# Patient Record
Sex: FEMALE | Race: BLACK OR AFRICAN AMERICAN | NOT HISPANIC OR LATINO | Employment: OTHER | URBAN - METROPOLITAN AREA
[De-identification: names, ages, dates, MRNs, and addresses within clinical notes are randomized per-mention and may not be internally consistent; named-entity substitution may affect disease eponyms.]

---

## 2017-08-14 ENCOUNTER — TELEPHONE (OUTPATIENT)
Dept: GASTROENTEROLOGY | Facility: CLINIC | Age: 52
End: 2017-08-14

## 2017-08-14 DIAGNOSIS — Z12.11 SPECIAL SCREENING FOR MALIGNANT NEOPLASMS, COLON: Primary | ICD-10-CM

## 2017-08-14 RX ORDER — CAPTOPRIL 25 MG/1
25 TABLET ORAL 2 TIMES DAILY
Refills: 3 | Status: ON HOLD | COMMUNITY
Start: 2017-07-12 | End: 2018-07-23

## 2017-08-14 RX ORDER — LOVASTATIN 20 MG/1
TABLET ORAL
Refills: 1 | COMMUNITY
Start: 2017-06-10 | End: 2020-03-19

## 2017-08-14 RX ORDER — NITROGLYCERIN 0.4 MG/1
TABLET SUBLINGUAL
Refills: 1 | COMMUNITY
Start: 2017-07-12 | End: 2020-08-12

## 2017-08-14 RX ORDER — ALBUTEROL SULFATE 90 UG/1
AEROSOL, METERED RESPIRATORY (INHALATION)
Refills: 3 | COMMUNITY
Start: 2017-07-12 | End: 2017-11-08 | Stop reason: SDUPTHER

## 2017-08-14 RX ORDER — AMLODIPINE BESYLATE 10 MG/1
TABLET ORAL
Refills: 3 | Status: ON HOLD | COMMUNITY
Start: 2017-07-12 | End: 2018-07-23

## 2017-08-14 RX ORDER — FLUTICASONE FUROATE AND VILANTEROL TRIFENATATE 100; 25 UG/1; UG/1
1 POWDER RESPIRATORY (INHALATION) DAILY
Refills: 1 | COMMUNITY
Start: 2017-07-28 | End: 2018-06-21

## 2017-08-14 RX ORDER — HYDROCHLOROTHIAZIDE 25 MG/1
TABLET ORAL
Refills: 3 | Status: ON HOLD | COMMUNITY
Start: 2017-07-12 | End: 2018-07-23

## 2017-08-14 RX ORDER — LORATADINE 10 MG/1
10 TABLET ORAL DAILY
Refills: 3 | Status: ON HOLD | COMMUNITY
Start: 2017-07-12 | End: 2022-01-01 | Stop reason: CLARIF

## 2017-08-14 NOTE — TELEPHONE ENCOUNTER
Reason for Colonoscopy Referral:screening   Any GI Symptoms:no   Last Colonoscopy: none  History of Colon Polyps: no  Family History of colon cancer or colon polyps (under 50- history of first degree relative w/colon cancer, what family member-age of onset: no  Iron Deficiency/Anemia: no   Meds reviewed and updated:yes   Anti-inflammatory meds/NSAIDS: no  Allergies updated: yes  6 month surgical history: no   Blood Thinners: no  Lung disease: no  Heart disease: no  Valve replacement: no  Kidney disease:no   SCHEDULED: 9/15/17     [facility]     Patient scheduled at [facility] for colonoscopy On 9/15/17 prep packet was mailed out and explained, patient verbalized understanding. Patient aware that surgery will call them with arrival time prior to scope.

## 2017-09-15 ENCOUNTER — TELEPHONE (OUTPATIENT)
Dept: GASTROENTEROLOGY | Facility: CLINIC | Age: 52
End: 2017-09-15

## 2018-03-16 ENCOUNTER — TELEPHONE (OUTPATIENT)
Dept: GASTROENTEROLOGY | Facility: CLINIC | Age: 53
End: 2018-03-16

## 2018-03-16 NOTE — TELEPHONE ENCOUNTER
A message was left on patient voice mail to give the office a call back in regards to scheduling Colonoscopy.

## 2018-04-02 ENCOUNTER — TELEPHONE (OUTPATIENT)
Dept: SURGERY | Facility: CLINIC | Age: 53
End: 2018-04-02

## 2018-04-02 DIAGNOSIS — Z12.11 SCREENING FOR COLON CANCER: Primary | ICD-10-CM

## 2018-04-02 RX ORDER — AMOXICILLIN 500 MG/1
CAPSULE ORAL
Refills: 0 | Status: ON HOLD | COMMUNITY
Start: 2018-01-13 | End: 2018-07-23 | Stop reason: HOSPADM

## 2018-04-02 RX ORDER — IBUPROFEN 800 MG/1
TABLET ORAL
Refills: 0 | COMMUNITY
Start: 2018-01-13 | End: 2020-03-19

## 2018-04-02 RX ORDER — TIOTROPIUM BROMIDE INHALATION SPRAY 3.12 UG/1
SPRAY, METERED RESPIRATORY (INHALATION)
Refills: 5 | COMMUNITY
Start: 2018-03-19 | End: 2018-06-21 | Stop reason: SDUPTHER

## 2018-04-02 RX ORDER — HYDROCODONE BITARTRATE AND ACETAMINOPHEN 5; 325 MG/1; MG/1
1 TABLET ORAL
Refills: 0 | COMMUNITY
Start: 2018-01-13 | End: 2018-06-21

## 2018-04-02 RX ORDER — PREDNISONE 10 MG/1
TABLET ORAL
Refills: 0 | COMMUNITY
Start: 2018-02-19 | End: 2018-04-20 | Stop reason: SDUPTHER

## 2018-04-02 RX ORDER — ALBUTEROL SULFATE 0.83 MG/ML
SOLUTION RESPIRATORY (INHALATION)
Refills: 3 | COMMUNITY
Start: 2018-03-21 | End: 2018-06-22

## 2018-04-02 NOTE — TELEPHONE ENCOUNTER
Reason for Colonoscopy Referral: Screening  Any GI Symptoms: No  Last Colonoscopy: Never  History of Colon Polyps: No  Family History of colon cancer or colon polyps (under 50- history of first degree relative w/colon cancer, what family member-age of onset: No  Iron Deficiency/Anemia:NO **IF YES, NEEDS APPT**  Meds reviewed and updated: yes 4/2/2018  Anti-inflammatory meds/NSAIDS: No  Allergies updated: yes 4/2/2018  6 month surgical history: No  Blood Thinners:No **IF YES, NEEDS APPT**  Lung disease: No  Heart disease: NO  Valve replacement: NO  Kidney disease: No  SCHEDULED: @[facility]

## 2018-04-10 ENCOUNTER — TELEPHONE (OUTPATIENT)
Dept: ENDOSCOPY | Facility: HOSPITAL | Age: 53
End: 2018-04-10

## 2018-04-10 DIAGNOSIS — Z12.11 SPECIAL SCREENING FOR MALIGNANT NEOPLASMS, COLON: Primary | ICD-10-CM

## 2018-04-10 RX ORDER — POLYETHYLENE GLYCOL 3350, SODIUM SULFATE ANHYDROUS, SODIUM BICARBONATE, SODIUM CHLORIDE, POTASSIUM CHLORIDE 236; 22.74; 6.74; 5.86; 2.97 G/4L; G/4L; G/4L; G/4L; G/4L
4 POWDER, FOR SOLUTION ORAL ONCE
Qty: 4000 ML | Refills: 0 | Status: SHIPPED | OUTPATIENT
Start: 2018-04-10 | End: 2018-04-10

## 2018-04-16 ENCOUNTER — TELEPHONE (OUTPATIENT)
Dept: ENDOSCOPY | Facility: HOSPITAL | Age: 53
End: 2018-04-16

## 2018-04-16 NOTE — TELEPHONE ENCOUNTER
Spoke with patient. Colonoscopy rescheduled to 5/1/18. New instructions mailed to patient. Patient verbalized understanding and has no questions at this time.

## 2018-04-16 NOTE — TELEPHONE ENCOUNTER
Dian,    I received a message from Alee/[facility] that the patient needs to reschedule Colonoscopy with Dr. Vázquez. She states the patient did not get instructions until today from daughter for appointment tomorrow.    Thank you,    Marichuy

## 2018-05-01 ENCOUNTER — TELEPHONE (OUTPATIENT)
Dept: ENDOSCOPY | Facility: HOSPITAL | Age: 53
End: 2018-05-01

## 2018-05-01 DIAGNOSIS — Z12.11 SPECIAL SCREENING FOR MALIGNANT NEOPLASMS, COLON: Primary | ICD-10-CM

## 2018-06-22 PROBLEM — D72.10 EOSINOPHILIA: Status: ACTIVE | Noted: 2018-06-22

## 2018-06-22 PROBLEM — F17.200 TOBACCO DEPENDENCE: Status: ACTIVE | Noted: 2018-06-22

## 2018-06-22 PROBLEM — J45.50 SEVERE PERSISTENT ASTHMA WITHOUT COMPLICATION: Status: ACTIVE | Noted: 2018-06-22

## 2018-07-14 PROBLEM — J45.901 EXACERBATION OF ASTHMA: Status: ACTIVE | Noted: 2018-07-14

## 2018-07-15 PROBLEM — R73.9 HYPERGLYCEMIA: Status: ACTIVE | Noted: 2018-07-15

## 2018-07-15 PROBLEM — J45.52 SEVERE PERSISTENT ASTHMA WITH STATUS ASTHMATICUS: Status: ACTIVE | Noted: 2018-06-22

## 2018-07-15 PROBLEM — J96.02 ACUTE RESPIRATORY FAILURE WITH HYPOXIA AND HYPERCARBIA: Status: ACTIVE | Noted: 2018-07-15

## 2018-07-15 PROBLEM — J96.01 ACUTE RESPIRATORY FAILURE WITH HYPOXIA AND HYPERCARBIA: Status: ACTIVE | Noted: 2018-07-15

## 2018-07-16 PROBLEM — D72.829 LEUKOCYTOSIS: Status: ACTIVE | Noted: 2018-07-16

## 2018-07-19 PROBLEM — R73.9 HYPERGLYCEMIA: Status: RESOLVED | Noted: 2018-07-15 | Resolved: 2018-07-19

## 2018-07-19 PROBLEM — D72.829 LEUKOCYTOSIS: Status: RESOLVED | Noted: 2018-07-16 | Resolved: 2018-07-19

## 2018-07-22 PROBLEM — J45.901 EXACERBATION OF ASTHMA: Status: ACTIVE | Noted: 2018-07-22

## 2018-08-23 ENCOUNTER — TELEPHONE (OUTPATIENT)
Dept: OBSTETRICS AND GYNECOLOGY | Facility: CLINIC | Age: 53
End: 2018-08-23

## 2018-08-23 NOTE — TELEPHONE ENCOUNTER
Contacted pt to inform Dr. Streeter is still in surgery until about 2:30, pt says her ride can't so she will reschedule. Pt rescheduled for 8/30 at 2:45pm, Patient verbalized understanding.

## 2018-11-07 PROBLEM — J96.01 ACUTE RESPIRATORY FAILURE WITH HYPOXIA AND HYPERCARBIA: Status: RESOLVED | Noted: 2018-07-15 | Resolved: 2018-11-07

## 2018-11-07 PROBLEM — J45.901 EXACERBATION OF ASTHMA: Status: RESOLVED | Noted: 2018-07-22 | Resolved: 2018-11-07

## 2018-11-07 PROBLEM — J45.50 SEVERE PERSISTENT ASTHMA WITHOUT COMPLICATION: Chronic | Status: ACTIVE | Noted: 2018-06-22

## 2018-11-07 PROBLEM — J96.02 ACUTE RESPIRATORY FAILURE WITH HYPOXIA AND HYPERCARBIA: Status: RESOLVED | Noted: 2018-07-15 | Resolved: 2018-11-07

## 2018-12-28 ENCOUNTER — HOSPITAL ENCOUNTER (OUTPATIENT)
Dept: RADIOLOGY | Facility: HOSPITAL | Age: 53
Discharge: HOME | End: 2018-12-28
Attending: INTERNAL MEDICINE
Payer: MEDICAID

## 2018-12-28 DIAGNOSIS — R05.3 COUGH, PERSISTENT: ICD-10-CM

## 2018-12-28 PROCEDURE — 71250 CT THORAX DX C-: CPT | Mod: TC,PO

## 2019-02-27 ENCOUNTER — TELEPHONE (OUTPATIENT)
Dept: PHARMACY | Facility: CLINIC | Age: 54
End: 2019-02-27

## 2019-03-02 PROBLEM — J45.52 SEVERE PERSISTENT ASTHMA WITH STATUS ASTHMATICUS: Status: ACTIVE | Noted: 2019-03-02

## 2019-03-02 PROBLEM — R06.03 RESPIRATORY DISTRESS: Status: ACTIVE | Noted: 2019-03-02

## 2019-03-02 PROBLEM — J44.89 STATUS ASTHMATICUS WITH COPD (CHRONIC OBSTRUCTIVE PULMONARY DISEASE): Status: ACTIVE | Noted: 2019-03-02

## 2019-03-02 PROBLEM — R73.03 PREDIABETES: Status: ACTIVE | Noted: 2018-10-15

## 2019-03-02 PROBLEM — J45.902 STATUS ASTHMATICUS WITH COPD (CHRONIC OBSTRUCTIVE PULMONARY DISEASE): Status: ACTIVE | Noted: 2019-03-02

## 2019-03-02 PROBLEM — J11.1 INFLUENZA: Status: ACTIVE | Noted: 2019-03-02

## 2019-03-05 PROBLEM — D72.829 LEUKOCYTOSIS: Status: RESOLVED | Noted: 2018-07-16 | Resolved: 2019-03-05

## 2019-03-06 PROBLEM — R06.03 RESPIRATORY DISTRESS: Status: RESOLVED | Noted: 2019-03-02 | Resolved: 2019-03-06

## 2019-03-06 NOTE — TELEPHONE ENCOUNTER
DOCUMENTATION ONLY  The prior authorization request for Fasenra was denied by the patient's insurance.   Forwarded to the clinical pharmacist for review. 03/11 9:12am BRITTANY          DOCUMENTATION ONLY  Submitted prior authorization request for Fasenra to insurance on 03/06 4:01pm. BRITTANY

## 2019-03-27 ENCOUNTER — TELEPHONE (OUTPATIENT)
Dept: PHARMACY | Facility: CLINIC | Age: 54
End: 2019-03-27

## 2019-03-27 NOTE — TELEPHONE ENCOUNTER
Call attempt 1 to inquire whether Ms. Littlejohn has recently filled her montelukast script. PA for Fasenra has been denied due to lack adherence with montelukast and missing FEV1 data. FEV1 data has been obtained. Once Ms. Littlejohn verifies she is currently taking the montelukast, the Fasenra can be appealed on her behalf. No VM option on Mobile; LVM on home number. MyChart not active.

## 2019-04-03 NOTE — TELEPHONE ENCOUNTER
Call attempt 2 to inquire whether Ms. Littlejohn has recently filled her montelukast script - LVM. PA for Fasenra has been denied due to lack adherence with montelukast and missing FEV1 data. FEV1 data has been obtained. Once Ms. Littlejohn verifies she is currently taking the montelukast, [facility] can resubmit with the required information.    Bon Cabello, PharmD  Clinical Pharmacist   [facility]   P: [phone]

## 2019-04-16 ENCOUNTER — TELEPHONE (OUTPATIENT)
Dept: PHARMACY | Facility: CLINIC | Age: 54
End: 2019-04-16

## 2019-04-16 NOTE — TELEPHONE ENCOUNTER
Spoke to patient concerning appeal for Fasenra. Insurance wants to make sure she is compliant on her Singulair. Patient states she is and fills at [facility]. Will submit the appeal with the given information.

## 2019-04-17 PROBLEM — J45.51 SEVERE PERSISTENT ASTHMA WITH ACUTE EXACERBATION: Chronic | Status: ACTIVE | Noted: 2019-03-02

## 2019-05-08 ENCOUNTER — TELEPHONE (OUTPATIENT)
Dept: PHARMACY | Facility: CLINIC | Age: 54
End: 2019-05-08

## 2019-05-08 NOTE — TELEPHONE ENCOUNTER
DOCUMENTATION ONLY  Submitted prior authorization request for Fasenra to insurance on 05/08 11:04am. BRITTANY

## 2019-05-15 NOTE — TELEPHONE ENCOUNTER
DOCUMENTATION ONLY  The prior authorization request for Fasenra was denied by the patient's insurance.   Forwarded to the clinical pharmacist for review. 05/15 12:56pm BRITTANY

## 2019-06-14 ENCOUNTER — TELEPHONE (OUTPATIENT)
Dept: PHARMACY | Facility: CLINIC | Age: 54
End: 2019-06-14

## 2019-06-14 NOTE — TELEPHONE ENCOUNTER
Call Attempt #1 for initial consultation for Jeannie.   Voice Mailbox Full. MyChart not available.

## 2019-06-14 NOTE — TELEPHONE ENCOUNTER
DOCUMENTATION ONLY:  Prior Authorization APPEAL for Jeannie approved from 06/14/19 to 06/14/2020    Co-pay: $0    Patient Assistance IS NOT required.    Forwarded to the clinical pharmacist for consult and shipment.    -ARR

## 2019-06-17 ENCOUNTER — TELEPHONE (OUTPATIENT)
Dept: PHARMACY | Facility: CLINIC | Age: 54
End: 2019-06-17

## 2019-06-17 NOTE — TELEPHONE ENCOUNTER
Initial Fasenra consult completed on . Fasenra will sent via  to provider's office on . $0.00 copay. Fasenra first dose date planned upon scheduled appointment. Address confirmed. Confirmed 2 patient identifiers - name and . Therapy Appropriate.    Will be delivered to  ATTN: Lida Kirkland  [facility]  [address] [facility], [facility]   [place] [address]    Indication: asthma  Goals of treatment: Reduction in frequency of asthma flares    Patient was counseled on the administration directions:  · To be injected by providers office - Inject 30mg subcutaneously every 4 weeks for the first 3 doses and every 8 weeks thereafter.   · Inject into upper arm, thigh or abdomen  · Do not discontinue asthma medications unless discussed with provider    Patient was counseled on the following possible side effects, which include, but are not limited to:  · Headache, pharyngitis, and fever  · Hypersensitivity- rash and itching      DDIs: medication list reviewed, no DDI's with Fasenra upon initial review.       Consultation included: the importance of compliance and the importance of keeping all follow up appointments.  Patient understands to report any medication changes to [facility] and provider. All questions answered and addressed to patients satisfaction. [facility] will touch base with patient 1 week after start and [facility] to contact patient in 3 weeks for refills. Patient verbalized understanding.      Devaughn Reynaga, PharmD  Clinical Pharmacist  [facility]  P: [phone]    [facility] sent  @ 4:32PM

## 2019-08-05 ENCOUNTER — TELEPHONE (OUTPATIENT)
Dept: PHARMACY | Facility: CLINIC | Age: 54
End: 2019-08-05

## 2019-08-05 NOTE — TELEPHONE ENCOUNTER
RX call regarding Fasenra from [facility] (2nd dose-30 days). Patient reached-- gave permission to send medication to [facility]. Copay of 0.00 @ 004. Address and  confirmed. Patient has 0 doses on hand at this time. Patient has not started any new medications, has had no missed doses and no side effects present. Patient is currently taking the medication as directed by doctors instruction. Patient states they do not have any questions or concerns at this time.     **Patient does not have an appointment set up yet, Skyped Lida Kirkland (CURTIS) in regards to sending the medication to her facility.. **    Lida Kirkland (LPN) replied giving permission to  on Wednesday,  to the address of:  ATTN: Lida Kirkland  [facility]  [address] [place] [address], [facility]   [place] [address]

## 2019-08-30 ENCOUNTER — TELEPHONE (OUTPATIENT)
Dept: PHARMACY | Facility: CLINIC | Age: 54
End: 2019-08-30

## 2019-08-30 NOTE — TELEPHONE ENCOUNTER
RX call regarding Fasenra from [facility]. Patient reached-- gave permission to send medication to [facility]. Name and  confirmed. Patient has 0 doses on hand at this time. Patient has not started any new medications, has had no missed doses and no side effects present. Patient is currently taking the medication as directed by doctors instruction. Patient states they do not have any questions or concerns at this time.     Patient does not have an appointment set up yet but it is due for Thursday, .    Emailed Cedrick Garcia for approval to  medication to his facility on 9/3. Awaiting a response..

## 2019-12-02 ENCOUNTER — TELEPHONE (OUTPATIENT)
Dept: PHARMACY | Facility: CLINIC | Age: 54
End: 2019-12-02

## 2019-12-02 NOTE — TELEPHONE ENCOUNTER
Call Attempt #1. Does not look like patient received Fasenra injection. Patient was a no-show for her 11/22 appointment but went to ER for asthma exacerbation. Not able to leave a message on either line.  No MyChart available.

## 2019-12-10 NOTE — TELEPHONE ENCOUNTER
Call attempt 2 to determine if patient received/is still taking Fasenra. Unable to leave message on mobile number (voicemail not setup) and home number out of service. MyChart pending.     Alton Meyer, PharmD  Clinical Pharmacist  [facility]  P: [phone]

## 2019-12-17 ENCOUNTER — TELEPHONE (OUTPATIENT)
Dept: PHARMACY | Facility: CLINIC | Age: 54
End: 2019-12-17

## 2019-12-17 NOTE — TELEPHONE ENCOUNTER
Patient states she is unsure if she is still supposed to be getting the Fasenra. She would like me to reach out to the [facility] to inquire. Yumiko garcia.

## 2019-12-19 NOTE — TELEPHONE ENCOUNTER
Refill and followup call for Fasenra. Patient confirmed need of the refill. Will deliver to   Attn: Cedrick Garcia  [facility]  [address] [place] [address].  [facility] [address]  On  with patient consent. Copay $0.00 at 004. Address confirmed. Patient has 0 doses remaining (0 day supply)/ next injection due upon appointment. Patient denies missed doses and no side effects.  No new medications/allergies/medical conditions. Labs are stable. No ER/Urgent care visits in past month. Patient taking the medication as directed. Patient denies any further questions. Confirmed 2 patient identifiers - Name and .     Devaughn Reynaga, PharmD  Clinical Pharmacist  [facility]  P: [phone]

## 2020-01-31 ENCOUNTER — TELEPHONE (OUTPATIENT)
Dept: PHARMACY | Facility: CLINIC | Age: 55
End: 2020-01-31

## 2020-02-05 NOTE — TELEPHONE ENCOUNTER
RX call attempt 2 regarding Fasenra refill from [facility]. Patient reached and stated she did not make her previous set appointment on Monday, 12/23 due to it raining and her being unable to get a ride.. Therefore the facility should still have 1 injection on hand.. Patient asked if I could reschedule her appointment, I explained that I do not have that capability that she would have to do so through the [facility]. Patient understood. Will alert [facility] group of missed dose, opening an Intervention and a Clinical Adherence Activity and pending out accordingly.   (copay $3.90)

## 2020-03-19 ENCOUNTER — TELEPHONE (OUTPATIENT)
Dept: PHARMACY | Facility: CLINIC | Age: 55
End: 2020-03-19

## 2020-04-01 ENCOUNTER — TELEPHONE (OUTPATIENT)
Dept: PHARMACY | Facility: CLINIC | Age: 55
End: 2020-04-01

## 2020-05-07 ENCOUNTER — TELEPHONE (OUTPATIENT)
Dept: PHARMACY | Facility: CLINIC | Age: 55
End: 2020-05-07

## 2020-05-07 NOTE — TELEPHONE ENCOUNTER
DOCUMENTATION ONLY:  Prior authorization for Fasenra approved from 01/01/20 to 12/31/20    Case Id: [id number]    Co-pay: $3.90    Patient Assistance IS NOT required

## 2020-05-11 ENCOUNTER — TELEPHONE (OUTPATIENT)
Dept: PHARMACY | Facility: CLINIC | Age: 55
End: 2020-05-11

## 2020-05-11 NOTE — TELEPHONE ENCOUNTER
Initial Fasenra consult completed on . Fasenra will be shipped on  to arrive at patient's home on  via [employer]. $ 3.90 copay and permission to charge CC on file. Patient intends to continue Fasenra on . Address confirmed. Confirmed 2 patient identifiers - name and . Therapy Appropriate.    Patient has been taking Fasenra as the facility-administered prefilled syringe. Patient states that she was shown how to do her administration with the auto injector at the provider's office, so she declined much of the administration consult. Encouraged her to review the manufacture video for administration and to review the documentation included with the Fasenra packaging.      Indication: Asthma    Storage: keep refrigerated, do not freeze, do not shake. Keep in original carton to protect from light. Keep away from heat. Fasenra is stable for 14 days at room temperature.    Take out of the refrigerator 30 minutes prior to injection and allow to reach room temperature.    Counseled patient on administration directions:  - Inject 30 mg (1 autoinjector) every 8 weeks.   - Remove auto injector from carton and inspect pen window and expiration date.  -Medication should be clear/colorless to slightly yellow. Air bubbles and small white particles are normal. Do not inject if liquid is cloudy, discolored, or contains large particles.   - Wash hands before and after injection.  - Monthly RX will come with gauze, band aids, and alcohol swabs.  - Patient may inject in either the tops of the thighs, abdomen- but at least 2 inches away from belly button, or the upper arm (with assistance).   - Discard in sharps container; do not recap. Once full, per [place] law, she/he should lock the sharps container and place it in trash. Pharmacy will replace the sharps at no additional charge.    Patient was counseled on possible side effects:   - headache and sore throat   - Injection site reaction: redness, soreness, itching, bruising,  which should resolve within 3-5 days. Monitor for signs of allergic reaction.  -Signs of an allergic reaction, like rash; hives; itching; red, swollen, blistered, or peeling skin with or without fever; wheezing; tightness in the chest or throat; trouble breathing or talking; unusual hoarseness; or swelling of the mouth, face, lips, tongue, or throat.    Med rec completed and allergies reviewed. No known drug/allergy interactions with Fasenra.    Advised to keep a calendar to stay compliant.     Consultation included the importance of compliance and of keeping all follow up appointments.  Patient understands to report any medication changes to [facility] and provider. All questions answered and addressed to patient's satisfaction. [facility] to contact patient in 3 weeks for refills.    Devaughn Reynaga, PharmD  Clinical Pharmacist  [facility]  P: [phone]

## 2020-07-01 ENCOUNTER — TELEPHONE (OUTPATIENT)
Dept: PHARMACY | Facility: CLINIC | Age: 55
End: 2020-07-01

## 2020-07-06 ENCOUNTER — TELEPHONE (OUTPATIENT)
Dept: PHARMACY | Facility: CLINIC | Age: 55
End: 2020-07-06

## 2020-08-20 DIAGNOSIS — U07.1 COVID-19 VIRUS DETECTED: ICD-10-CM

## 2020-08-25 ENCOUNTER — TELEPHONE (OUTPATIENT)
Dept: PHARMACY | Facility: CLINIC | Age: 55
End: 2020-08-25

## 2020-11-29 ENCOUNTER — SPECIALTY PHARMACY (OUTPATIENT)
Dept: PHARMACY | Facility: CLINIC | Age: 55
End: 2020-11-29

## 2020-12-10 NOTE — TELEPHONE ENCOUNTER
Initial Fasenra consult declined on 12/10. Patient is restarting Fasenra and does not want to review anything. Fasenra will be sent to patient's house. MD states patient can restart treatment with at home dosing.  $0 copay.  Fasenra first dose date planned for . Address confirmed. Confirmed 2 patient identifiers - name and . Therapy Appropriate   Specialty Pharmacy - Initial Clinical Assessment  Specialty Pharmacy - Medication/Referral Authorization    Specialty Medication Orders Linked to Encounter      Most Recent Value   Medication #1  benralizumab (FASENRA PEN) 30 mg/mL AtIn (Order#[id number], Rx#)   Medication #2  benralizumab (FASENRA PEN) 30 mg/mL AtIn (Order#[id number], Rx#)          Refill Questions - Documented Responses      Most Recent Value   Relationship to patient of person spoken to?  Self   HIPAA/medical authority confirmed?  Yes   Can the patient store medication/sharps container properly (at the correct temperature, away from children/pets, etc.)?  Yes   Can the patient call emergency services (911) in the event of an emergency?  Yes   Does the patient have any concerns or questions about taking or administering this medication as prescribed?  No   How many doses does the patient have on hand?  0   During the past 4 weeks, has patient missed any activities due to condition or medication?  No   During the past 4 weeks, did patient have any of the following urgent care visits?  None   How will the patient receive the medication?  Mail   When does the patient need to receive the medication?  20   Shipping Address  Home   Address in [facility] confirmed and updated if neccessary?  Yes   Expected Copay ($)  0   Is the patient able to afford the medication copay?  Yes   Payment Method  zero copay   Days supply of Refill  28   Refill activity completed?  Yes   Refill activity plan  Refill scheduled   Shipment/Pickup Date:  20          Current Outpatient Medications   Medication  Sig    albuterol (ACCUNEB) 1.25 mg/3 mL Nebu Take 3 mLs (1.25 mg total) by nebulization every 6 (six) hours as needed. Rescue    albuterol (PROAIR HFA) 90 mcg/actuation inhaler Inhale 2 puffs into the lungs every 6 (six) hours as needed for Wheezing. Rescue    albuterol-ipratropium (DUO-NEB) 2.5 mg-0.5 mg/3 mL nebulizer solution 3 mLs.    amLODIPine (NORVASC) 10 MG tablet Take 1 tablet (10 mg total) by mouth once daily.    atorvastatin (LIPITOR) 40 MG tablet Take 40 mg by mouth once daily.    benralizumab (FASENRA PEN) 30 mg/mL AtIn Inject 30 mg into the skin every 28 days.    benralizumab (FASENRA PEN) 30 mg/mL AtIn Inject 30 mg into the skin every 8 weeks.    blood-glucose meter Misc 1 Units.    captopril (CAPOTEN) 25 MG tablet Take 1 tablet (25 mg total) by mouth 2 (two) times daily.    ciprofloxacin HCl (CILOXAN) 0.3 % ophthalmic solution INSTILL 1 DROP INTO RIGHT EYE 4 TIMES A DAY    fluticasone propionate (FLOVENT HFA) 110 mcg/actuation inhaler Inhale 1 puff into the lungs every 4 to 6 hours as needed (1 puff in combination with albuterol as needed for rescue).    fluticasone-salmeterol diskus inhaler 500-50 mcg Inhale 1 puff into the lungs 2 (two) times daily. Controller    hydroCHLOROthiazide (HYDRODIURIL) 25 MG tablet Take 1 tablet (25 mg total) by mouth once daily.    ketorolac 0.5% (ACULAR) 0.5 % Drop INSTILL 1 DROP INTO RIGHT EYE 4 TIMES A DAY    lisinopriL (PRINIVIL,ZESTRIL) 40 MG tablet Take 40 mg by mouth once daily.    loratadine (CLARITIN) 10 mg tablet Take 10 mg by mouth once daily.    metFORMIN (GLUCOPHAGE-XR) 500 MG ER 24hr tablet Take 500 mg by mouth 2 (two) times daily.    metoprolol succinate (TOPROL-XL) 25 MG 24 hr tablet Take 25 mg by mouth once daily.    montelukast (SINGULAIR) 10 mg tablet montelukast 10 mg tablet   TAKE 1 TABLET BY MOUTH EVERY DAY    prednisoLONE acetate (PRED FORTE) 1 % DrpS INSTILL 1 DROP INTO RIGHT EYE 4 TIMES A DAY    predniSONE (DELTASONE) 10 MG  tablet Take 1 tablet (10 mg total) by mouth once daily.    SPIRIVA WITH HANDIHALER 18 mcg inhalation capsule Inhale 1 capsule into the lungs once daily.   Last reviewed on 11/12/2020  4:23 PM by Iliana Hinson NP    Review of patient's allergies indicates:   Allergen Reactions    Aleve [naproxen sodium] Hives, Shortness Of Breath and Itching    Pneumococcal 23-carrington ps vaccine Itching    Last reviewed on  11/12/2020 4:23 PM by Iliana Hinson      Tasks added this encounter   1/6/2021 - Refill Call (Auto Added)  3/9/2021 - Clinical - Follow Up Assesement (90 day)   Tasks due within next 3 months   No tasks due.     Lida Dawson, PharmD  [facility]  [address] [place] [id number]  Phone: [phone]  Fax: [phone]

## 2021-01-01 ENCOUNTER — SPECIALTY PHARMACY (OUTPATIENT)
Dept: PHARMACY | Facility: CLINIC | Age: 56
End: 2021-01-01
Payer: MEDICARE

## 2021-01-01 ENCOUNTER — SPECIALTY PHARMACY (OUTPATIENT)
Dept: PHARMACY | Facility: CLINIC | Age: 56
End: 2021-01-01

## 2021-01-01 ENCOUNTER — TELEPHONE (OUTPATIENT)
Dept: SMOKING CESSATION | Facility: CLINIC | Age: 56
End: 2021-01-01
Payer: MEDICARE

## 2021-01-12 ENCOUNTER — SPECIALTY PHARMACY (OUTPATIENT)
Dept: PHARMACY | Facility: CLINIC | Age: 56
End: 2021-01-12

## 2021-02-02 ENCOUNTER — SPECIALTY PHARMACY (OUTPATIENT)
Dept: PHARMACY | Facility: CLINIC | Age: 56
End: 2021-02-02

## 2021-02-05 ENCOUNTER — OFFICE VISIT (OUTPATIENT)
Dept: URGENT CARE | Facility: CLINIC | Age: 56
End: 2021-02-05
Payer: MEDICARE

## 2021-02-05 VITALS
HEIGHT: 62 IN | WEIGHT: 150 LBS | HEART RATE: 79 BPM | OXYGEN SATURATION: 95 % | SYSTOLIC BLOOD PRESSURE: 183 MMHG | BODY MASS INDEX: 27.6 KG/M2 | TEMPERATURE: 98 F | DIASTOLIC BLOOD PRESSURE: 93 MMHG | RESPIRATION RATE: 24 BRPM

## 2021-02-05 DIAGNOSIS — Z72.0 TOBACCO USE: ICD-10-CM

## 2021-02-05 DIAGNOSIS — J44.1 COPD EXACERBATION: Primary | ICD-10-CM

## 2021-02-05 PROCEDURE — 1126F PR PAIN SEVERITY QUANTIFIED, NO PAIN PRESENT: ICD-10-PCS | Mod: S$GLB,,, | Performed by: PHYSICIAN ASSISTANT

## 2021-02-05 PROCEDURE — 94640 PR INHAL RX, AIRWAY OBST/DX SPUTUM INDUCT: ICD-10-PCS | Mod: S$GLB,,, | Performed by: FAMILY MEDICINE

## 2021-02-05 PROCEDURE — 96372 PR INJECTION,THERAP/PROPH/DIAG2ST, IM OR SUBCUT: ICD-10-PCS | Mod: 59,S$GLB,, | Performed by: FAMILY MEDICINE

## 2021-02-05 PROCEDURE — 1126F AMNT PAIN NOTED NONE PRSNT: CPT | Mod: S$GLB,,, | Performed by: PHYSICIAN ASSISTANT

## 2021-02-05 PROCEDURE — 99205 PR OFFICE/OUTPT VISIT, NEW, LEVL V, 60-74 MIN: ICD-10-PCS | Mod: 25,S$GLB,, | Performed by: PHYSICIAN ASSISTANT

## 2021-02-05 PROCEDURE — 3008F BODY MASS INDEX DOCD: CPT | Mod: CPTII,S$GLB,, | Performed by: PHYSICIAN ASSISTANT

## 2021-02-05 PROCEDURE — 94640 AIRWAY INHALATION TREATMENT: CPT | Mod: S$GLB,,, | Performed by: FAMILY MEDICINE

## 2021-02-05 PROCEDURE — 96372 THER/PROPH/DIAG INJ SC/IM: CPT | Mod: 59,S$GLB,, | Performed by: FAMILY MEDICINE

## 2021-02-05 PROCEDURE — 3008F PR BODY MASS INDEX (BMI) DOCUMENTED: ICD-10-PCS | Mod: CPTII,S$GLB,, | Performed by: PHYSICIAN ASSISTANT

## 2021-02-05 PROCEDURE — 99205 OFFICE O/P NEW HI 60 MIN: CPT | Mod: 25,S$GLB,, | Performed by: PHYSICIAN ASSISTANT

## 2021-02-05 RX ORDER — PREDNISONE 20 MG/1
40 TABLET ORAL DAILY
Qty: 6 TABLET | Refills: 1 | Status: SHIPPED | OUTPATIENT
Start: 2021-02-05 | End: 2021-02-10

## 2021-02-05 RX ORDER — PREDNISONE 20 MG/1
20 TABLET ORAL
Status: COMPLETED | OUTPATIENT
Start: 2021-02-05 | End: 2021-02-05

## 2021-02-05 RX ORDER — FLUTICASONE PROPIONATE AND SALMETEROL 500; 50 UG/1; UG/1
POWDER RESPIRATORY (INHALATION)
COMMUNITY
End: 2021-02-10 | Stop reason: SDUPTHER

## 2021-02-05 RX ORDER — ALBUTEROL SULFATE 0.63 MG/3ML
1.26 SOLUTION RESPIRATORY (INHALATION)
Status: DISCONTINUED | OUTPATIENT
Start: 2021-02-05 | End: 2021-02-05

## 2021-02-05 RX ORDER — ALBUTEROL SULFATE 0.83 MG/ML
5 SOLUTION RESPIRATORY (INHALATION)
Status: COMPLETED | OUTPATIENT
Start: 2021-02-05 | End: 2021-02-05

## 2021-02-05 RX ORDER — IPRATROPIUM BROMIDE 0.5 MG/2.5ML
1 SOLUTION RESPIRATORY (INHALATION)
Status: COMPLETED | OUTPATIENT
Start: 2021-02-05 | End: 2021-02-05

## 2021-02-05 RX ORDER — FLUTICASONE PROPIONATE 110 UG/1
1 AEROSOL, METERED RESPIRATORY (INHALATION) 2 TIMES DAILY
Qty: 12 G | Refills: 0 | Status: SHIPPED | OUTPATIENT
Start: 2021-02-05 | End: 2021-03-01

## 2021-02-05 RX ORDER — AZITHROMYCIN 250 MG/1
TABLET, FILM COATED ORAL
Qty: 6 TABLET | Refills: 0 | Status: SHIPPED | OUTPATIENT
Start: 2021-02-05 | End: 2021-02-10

## 2021-02-05 RX ADMIN — ALBUTEROL SULFATE 5 MG: 0.83 SOLUTION RESPIRATORY (INHALATION) at 10:02

## 2021-02-05 RX ADMIN — IPRATROPIUM BROMIDE 1 MG: 0.5 SOLUTION RESPIRATORY (INHALATION) at 10:02

## 2021-02-05 RX ADMIN — PREDNISONE 20 MG: 20 TABLET ORAL at 10:02

## 2021-03-09 ENCOUNTER — TELEPHONE (OUTPATIENT)
Dept: SMOKING CESSATION | Facility: CLINIC | Age: 56
End: 2021-03-09

## 2021-03-16 ENCOUNTER — TELEPHONE (OUTPATIENT)
Dept: SMOKING CESSATION | Facility: CLINIC | Age: 56
End: 2021-03-16

## 2021-03-16 ENCOUNTER — CLINICAL SUPPORT (OUTPATIENT)
Dept: SMOKING CESSATION | Facility: CLINIC | Age: 56
End: 2021-03-16
Payer: COMMERCIAL

## 2021-03-16 DIAGNOSIS — F17.210 LIGHT CIGARETTE SMOKER (1-9 CIGS/DAY): Primary | ICD-10-CM

## 2021-03-16 PROCEDURE — 99404 PREV MED CNSL INDIV APPRX 60: CPT | Mod: S$GLB,,,

## 2021-03-16 PROCEDURE — 99404 PR PREVENT COUNSEL,INDIV,60 MIN: ICD-10-PCS | Mod: S$GLB,,,

## 2021-03-16 RX ORDER — IBUPROFEN 200 MG
1 TABLET ORAL DAILY
Qty: 14 PATCH | Refills: 0 | Status: SHIPPED | OUTPATIENT
Start: 2021-03-16 | End: 2022-01-01

## 2021-03-16 RX ORDER — IBUPROFEN 200 MG
1 TABLET ORAL DAILY
Qty: 14 PATCH | Refills: 0 | Status: SHIPPED | OUTPATIENT
Start: 2021-03-16 | End: 2021-03-16

## 2021-03-23 ENCOUNTER — SPECIALTY PHARMACY (OUTPATIENT)
Dept: PHARMACY | Facility: CLINIC | Age: 56
End: 2021-03-23

## 2021-03-29 ENCOUNTER — SPECIALTY PHARMACY (OUTPATIENT)
Dept: PHARMACY | Facility: CLINIC | Age: 56
End: 2021-03-29

## 2021-03-30 ENCOUNTER — TELEPHONE (OUTPATIENT)
Dept: SMOKING CESSATION | Facility: CLINIC | Age: 56
End: 2021-03-30

## 2021-05-17 ENCOUNTER — SPECIALTY PHARMACY (OUTPATIENT)
Dept: PHARMACY | Facility: CLINIC | Age: 56
End: 2021-05-17

## 2022-01-01 ENCOUNTER — CLINICAL SUPPORT (OUTPATIENT)
Dept: SMOKING CESSATION | Facility: CLINIC | Age: 57
End: 2022-01-01
Payer: COMMERCIAL

## 2022-01-01 ENCOUNTER — PATIENT OUTREACH (OUTPATIENT)
Dept: ADMINISTRATIVE | Facility: CLINIC | Age: 57
End: 2022-01-01
Payer: MEDICARE

## 2022-01-01 ENCOUNTER — TELEPHONE (OUTPATIENT)
Dept: SMOKING CESSATION | Facility: CLINIC | Age: 57
End: 2022-01-01
Payer: MEDICARE

## 2022-01-01 ENCOUNTER — OFFICE VISIT (OUTPATIENT)
Dept: CARDIOLOGY | Facility: CLINIC | Age: 57
End: 2022-01-01
Payer: MEDICARE

## 2022-01-01 VITALS
BODY MASS INDEX: 30.51 KG/M2 | WEIGHT: 165.81 LBS | HEART RATE: 115 BPM | DIASTOLIC BLOOD PRESSURE: 86 MMHG | HEIGHT: 62 IN | OXYGEN SATURATION: 96 % | SYSTOLIC BLOOD PRESSURE: 146 MMHG

## 2022-01-01 DIAGNOSIS — F17.200 NICOTINE DEPENDENCE: Primary | ICD-10-CM

## 2022-01-01 DIAGNOSIS — J45.50 SEVERE PERSISTENT ASTHMA WITHOUT COMPLICATION: Chronic | ICD-10-CM

## 2022-01-01 DIAGNOSIS — R73.03 PREDIABETES: ICD-10-CM

## 2022-01-01 DIAGNOSIS — R60.0 PEDAL EDEMA: ICD-10-CM

## 2022-01-01 DIAGNOSIS — J44.1 COPD EXACERBATION: ICD-10-CM

## 2022-01-01 DIAGNOSIS — E66.09 CLASS 1 OBESITY DUE TO EXCESS CALORIES WITH SERIOUS COMORBIDITY AND BODY MASS INDEX (BMI) OF 30.0 TO 30.9 IN ADULT: ICD-10-CM

## 2022-01-01 DIAGNOSIS — R06.02 SOB (SHORTNESS OF BREATH) ON EXERTION: ICD-10-CM

## 2022-01-01 DIAGNOSIS — F17.200 TOBACCO DEPENDENCE: ICD-10-CM

## 2022-01-01 DIAGNOSIS — I10 ESSENTIAL HYPERTENSION: Chronic | ICD-10-CM

## 2022-01-01 DIAGNOSIS — E78.5 HYPERLIPIDEMIA, UNSPECIFIED HYPERLIPIDEMIA TYPE: ICD-10-CM

## 2022-01-01 PROCEDURE — 99203 PR OFFICE/OUTPT VISIT, NEW, LEVL III, 30-44 MIN: ICD-10-PCS | Mod: S$GLB,,,

## 2022-01-01 PROCEDURE — 99999 PR PBB SHADOW E&M-EST. PATIENT-LVL IV: ICD-10-PCS | Mod: PBBFAC,,,

## 2022-01-01 PROCEDURE — 99407 PR TOBACCO USE CESSATION INTENSIVE >10 MINUTES: ICD-10-PCS | Mod: S$GLB,,,

## 2022-01-01 PROCEDURE — 3008F PR BODY MASS INDEX (BMI) DOCUMENTED: ICD-10-PCS | Mod: CPTII,S$GLB,,

## 2022-01-01 PROCEDURE — 3079F DIAST BP 80-89 MM HG: CPT | Mod: CPTII,S$GLB,,

## 2022-01-01 PROCEDURE — 99404 PR PREVENT COUNSEL,INDIV,60 MIN: ICD-10-PCS | Mod: S$GLB,,,

## 2022-01-01 PROCEDURE — 99999 PR PBB SHADOW E&M-EST. PATIENT-LVL IV: CPT | Mod: PBBFAC,,,

## 2022-01-01 PROCEDURE — 99404 PREV MED CNSL INDIV APPRX 60: CPT | Mod: S$GLB,,,

## 2022-01-01 PROCEDURE — 4010F ACE/ARB THERAPY RXD/TAKEN: CPT | Mod: CPTII,S$GLB,,

## 2022-01-01 PROCEDURE — 99407 BEHAV CHNG SMOKING > 10 MIN: CPT | Mod: S$GLB,,,

## 2022-01-01 PROCEDURE — 1160F PR REVIEW ALL MEDS BY PRESCRIBER/CLIN PHARMACIST DOCUMENTED: ICD-10-PCS | Mod: CPTII,S$GLB,,

## 2022-01-01 PROCEDURE — 3079F PR MOST RECENT DIASTOLIC BLOOD PRESSURE 80-89 MM HG: ICD-10-PCS | Mod: CPTII,S$GLB,,

## 2022-01-01 PROCEDURE — 1159F MED LIST DOCD IN RCRD: CPT | Mod: CPTII,S$GLB,,

## 2022-01-01 PROCEDURE — 99203 OFFICE O/P NEW LOW 30 MIN: CPT | Mod: S$GLB,,,

## 2022-01-01 PROCEDURE — 1159F PR MEDICATION LIST DOCUMENTED IN MEDICAL RECORD: ICD-10-PCS | Mod: CPTII,S$GLB,,

## 2022-01-01 PROCEDURE — 4010F PR ACE/ARB THEARPY RXD/TAKEN: ICD-10-PCS | Mod: CPTII,S$GLB,,

## 2022-01-01 PROCEDURE — 3008F BODY MASS INDEX DOCD: CPT | Mod: CPTII,S$GLB,,

## 2022-01-01 PROCEDURE — 3077F PR MOST RECENT SYSTOLIC BLOOD PRESSURE >= 140 MM HG: ICD-10-PCS | Mod: CPTII,S$GLB,,

## 2022-01-01 PROCEDURE — 1160F RVW MEDS BY RX/DR IN RCRD: CPT | Mod: CPTII,S$GLB,,

## 2022-01-01 PROCEDURE — 3077F SYST BP >= 140 MM HG: CPT | Mod: CPTII,S$GLB,,

## 2022-01-01 RX ORDER — MICONAZOLE NITRATE 2 %
2 CREAM (GRAM) TOPICAL
Qty: 220 EACH | Refills: 0 | Status: SHIPPED | OUTPATIENT
Start: 2022-01-01 | End: 2022-01-01

## 2022-01-01 RX ORDER — CHLORTHALIDONE 25 MG/1
12.5 TABLET ORAL DAILY
Qty: 15 TABLET | Refills: 11 | Status: SHIPPED | OUTPATIENT
Start: 2022-01-01 | End: 2023-03-18

## 2022-01-18 NOTE — PROGRESS NOTES
C3 nurse attempted to contact Kendra Littlejohn for a TCC post hospital discharge follow up call. No answer. Left voicemail with callback information. The patient does not have a scheduled HOSFU appointment.

## 2022-01-19 NOTE — PROGRESS NOTES
C3 nurse attempted to contact Kendra Littlejohn for a TCC post hospital discharge follow up call. No answer. Left voicemail with callback information. The patient does not have a scheduled HOSFU appointment

## 2022-03-12 PROBLEM — R60.0 PEDAL EDEMA: Status: ACTIVE | Noted: 2022-01-01

## 2022-03-18 PROBLEM — R06.02 SOB (SHORTNESS OF BREATH) ON EXERTION: Status: ACTIVE | Noted: 2022-01-01

## 2022-03-18 PROBLEM — E66.09 CLASS 1 OBESITY DUE TO EXCESS CALORIES WITH SERIOUS COMORBIDITY AND BODY MASS INDEX (BMI) OF 30.0 TO 30.9 IN ADULT: Status: ACTIVE | Noted: 2019-03-02

## 2022-03-18 NOTE — ASSESSMENT & PLAN NOTE
-Continue Furosemide 40 mg PRN leg swelling   -Weight daily-if gained > 2 pounds in a day or 5 pounds in a week take a dose of Lasix   -Cardiac Echo evaluate heart function

## 2022-03-18 NOTE — ASSESSMENT & PLAN NOTE
-Uncontrolled in office 146/86  -Goal BP < 130/80  -Continue medication regimen; diltiazem 120 mg, lisinopril 20 mg   -Added chlorthalidone 12.5 mg   -Monitor BP at home twice daily and maintain log, bring BP log to all appts.  -Discussed lifestyle modifications benefits: diet, exercise, weight loss   -Discussed Low salt diet

## 2022-03-18 NOTE — PROGRESS NOTES
Subjective:    Patient ID:  Kendra Littlejohn is a 56 y.o. female who presents for evaluation of COPD      PCP: Lorena Wilson NP (Inactive)         HPI: 57 yo F w/ PMH  HTN, asthma, tobacco abuse, COPD home O2 (2Lpm) dependent, HLD presents to clinic for hospital follow up of resp distress. In hospital patient received Nebs and IV steroids. She also received IV lasix for BLE edema. Patient also had elevated BP and lisinopril was added to home BP regimen. Patient reports claudication in left calf with walking short distance and also reports calf spasms during sleep.  Patient denies CP, pre-syncope, or LOC. Patient has not monitored her BP, she needs to gate a new monitor. Patient reports walking short distances but is limited secondary to TUTTLE. She reports compliance with low salt diet but drinks 2-3 Soda a day.      Past Medical History:   Diagnosis Date    Anxiety     Asthma     COPD (chronic obstructive pulmonary disease)     HLD (hyperlipidemia)     Hypertension      Past Surgical History:   Procedure Laterality Date    CATARACT EXTRACTION W/  INTRAOCULAR LENS IMPLANT Right 2020    Procedure: EXTRACTION, CATARACT, WITH IOL INSERTION;  Surgeon: Vinay Chen MD;  Location: [facility], [facility]1983 OR;  Service: Ophthalmology;  Laterality: Right;     SECTION      PHACOEMULSIFICATION OF CATARACT Right 2020    Procedure: PHACOEMULSIFICATION, CATARACT;  Surgeon: Vinay Chen MD;  Location: [facility], [facility]1983 OR;  Service: Ophthalmology;  Laterality: Right;     Social History     Socioeconomic History    Marital status: Single   Tobacco Use    Smoking status: Current Some Day Smoker     Packs/day: 0.15     Years: 2.00     Pack years: 0.30     Types: Cigarettes     Start date: 2015    Smokeless tobacco: Never Used   Substance and Sexual Activity    Alcohol use: Not Currently    Drug use: No    Sexual activity: Not Currently     Family History   Problem Relation Age of Onset    Asthma Sister      Hypertension Sister     Asthma Grandchild     Asthma Grandchild        Review of patient's allergies indicates:   Allergen Reactions    Aleve [naproxen sodium] Hives, Shortness Of Breath and Itching       Medication List with Changes/Refills   Current Medications    ALBUTEROL (PROAIR HFA) 90 MCG/ACTUATION INHALER    Inhale 2 puffs into the lungs every 6 (six) hours as needed for Wheezing or Shortness of Breath. Rescue    ALBUTEROL-IPRATROPIUM (DUO-NEB) 2.5 MG-0.5 MG/3 ML NEBULIZER SOLUTION    Take 3 mLs by nebulization every 6 (six) hours as needed for Wheezing. Rescue    ATORVASTATIN (LIPITOR) 40 MG TABLET    Take 40 mg by mouth once daily.    BUSPIRONE (BUSPAR) 10 MG TABLET    Take 10 mg by mouth 2 (two) times a day.    DILTIAZEM  MG CP12    Take 120 mg by mouth once daily.    FLOVENT  MCG/ACTUATION INHALER    INHALE 1 PUFF INTO THE LUNGS 2 (TWO) TIMES DAILY. CONTROLLER    FLUTICASONE-SALMETEROL DISKUS INHALER 500-50 MCG    Inhale 1 puff into the lungs 2 (two) times daily. Controller    FUROSEMIDE (LASIX) 40 MG TABLET    Take 1 tablet (40 mg total) by mouth daily as needed (pedal edema).    HYDROXYZINE HCL (ATARAX) 25 MG TABLET    Take 25 mg by mouth daily as needed for Anxiety.    LISINOPRIL (PRINIVIL,ZESTRIL) 20 MG TABLET    Take 1 tablet (20 mg total) by mouth once daily.    MONTELUKAST (SINGULAIR) 10 MG TABLET    montelukast 10 mg tablet   TAKE 1 TABLET BY MOUTH EVERY DAY    PREDNISONE (DELTASONE) 10 MG TABLET    Take 5 tablets (50 mg total) by mouth once daily for 3 days, THEN 4 tablets (40 mg total) once daily for 3 days, THEN 3 tablets (30 mg total) once daily for 3 days, THEN 2 tablets (20 mg total) once daily for 3 days, THEN 2 tablets (20 mg total) once daily for 3 days, THEN 1 tablet (10 mg total) once daily for 3 days.    SPIRIVA WITH HANDIHALER 18 MCG INHALATION CAPSULE    Inhale 1 capsule (18 mcg total) into the lungs once daily.       Review of Systems   Constitutional: Negative for  "diaphoresis and fever.   HENT: Negative for congestion and hearing loss.    Eyes: Negative for blurred vision and pain.   Cardiovascular: Positive for dyspnea on exertion. Negative for chest pain, claudication, leg swelling, near-syncope, palpitations and syncope.   Respiratory: Positive for cough and shortness of breath. Negative for sleep disturbances due to breathing.    Hematologic/Lymphatic: Negative for bleeding problem. Does not bruise/bleed easily.   Skin: Negative for color change and poor wound healing.   Gastrointestinal: Negative for abdominal pain and nausea.   Genitourinary: Negative for bladder incontinence and flank pain.   Neurological: Negative for focal weakness and light-headedness.        Objective:   BP (!) 146/86   Pulse (!) 115   Ht 5' 2" (1.575 m)   Wt 75.2 kg (165 lb 12.8 oz)   LMP  (LMP Unknown)   SpO2 96%   BMI 30.33 kg/m²    Physical Exam  Constitutional:       Appearance: She is well-developed. She is not diaphoretic.   HENT:      Head: Normocephalic and atraumatic.   Eyes:      General: No scleral icterus.     Pupils: Pupils are equal, round, and reactive to light.   Neck:      Vascular: No JVD.   Cardiovascular:      Rate and Rhythm: Normal rate and regular rhythm.      Pulses: Intact distal pulses.           Radial pulses are 2+ on the right side and 2+ on the left side.        Dorsalis pedis pulses are 2+ on the right side and 2+ on the left side.        Posterior tibial pulses are 2+ on the right side and 2+ on the left side.      Heart sounds: S1 normal and S2 normal. Murmur heard.     No friction rub. No gallop.      Comments: 1+ trace edema PRESLEY feet   Pulmonary:      Effort: Pulmonary effort is normal. No respiratory distress.      Breath sounds: Normal breath sounds. No wheezing or rales.   Chest:      Chest wall: No tenderness.   Abdominal:      General: Bowel sounds are normal. There is no distension.      Palpations: Abdomen is soft. There is no mass.      Tenderness: " There is no abdominal tenderness. There is no rebound.   Musculoskeletal:         General: No tenderness. Normal range of motion.      Cervical back: Normal range of motion and neck supple.      Right foot: Swelling present.      Left foot: Swelling present.   Skin:     General: Skin is warm and dry.      Coloration: Skin is not pale.   Neurological:      Mental Status: She is alert and oriented to person, place, and time.      Coordination: Coordination normal.      Deep Tendon Reflexes: Reflexes normal.   Psychiatric:         Behavior: Behavior normal.         Judgment: Judgment normal.           Assessment:       1. Essential hypertension    2. Hyperlipidemia, unspecified hyperlipidemia type    3. Prediabetes    4. Class 1 obesity due to excess calories with serious comorbidity and body mass index (BMI) of 30.0 to 30.9 in adult    5. Tobacco dependence    6. Pedal edema    7. COPD exacerbation    8. Severe persistent asthma without complication    9. SOB (shortness of breath) on exertion         Plan:         Essential hypertension  -Uncontrolled in office 146/86  -Goal BP < 130/80  -Continue medication regimen; diltiazem 120 mg, lisinopril 20 mg   -Added chlorthalidone 12.5 mg   -Monitor BP at home twice daily and maintain log, bring BP log to all appts.  -Discussed lifestyle modifications benefits: diet, exercise, weight loss   -Discussed Low salt diet     Hyperlipidemia  -Continue statin therapy- atorvastatin 40 mg   -Discussed Low fat/ Low cholesterol diet     Prediabetes  -Followed by PCP  -A1c 6.7 3/2/19    Class 1 obesity due to excess calories with serious comorbidity and body mass index (BMI) of 30.0 to 30.9 in adult  -Discussed lifestyle modification and benefits: diet, exercise, weight loss        Tobacco dependence  -Current 0.5 pk/day   -Counseling about smoking cessation provided, patient declines at present time       Pedal edema  -Continue Furosemide 40 mg PRN leg swelling   -Weight daily-if  gained > 2 pounds in a day or 5 pounds in a week take a dose of Lasix   -Cardiac Echo evaluate heart function     Severe persistent asthma without complication  -Followed by Pulmonary     SOB (shortness of breath) on exertion  -Reports SOB with activity   -Cardiac echo to evaluate heart function       Total duration of face to face visit time 30 minutes.  Total time spent counseling greater than fifty percent of total visit time.  Counseling included discussion regarding imaging findings, diagnosis, possibilities, treatment options, risks and benefits.  The patient had many questions regarding the options and long-term effects      Saleem Cole NP  Cardiology

## 2022-03-18 NOTE — ASSESSMENT & PLAN NOTE
-Current 0.5 pk/day   -Counseling about smoking cessation provided, patient declines at present time

## 2022-03-28 NOTE — PROGRESS NOTES
Spoke with patient today in regard to smoking cessation progress for 12 month phone follow up on quit 1. Patient not tobacco free at this time. Patient has scheduled an appointment to return to the program for Quit attempt #2. Informed patient of benefit period, future follow ups, and contact information if any further help or support is needed. Will complete smart form and resolve Quit attempt #1.

## 2022-04-06 NOTE — Clinical Note
Pt seen in clinic for tobacco cessation intake #2. Pt currently smoke 2-3 cigarettes per day and will begin weekly tobacco cessation sessions. Pt agreed to take prescribed tobacco cessation medication regimen of 2 mg nicotine gum. Prescribed medications will be managed by physician and monitored by CTTS.

## 2022-04-25 PROBLEM — E87.6 HYPOKALEMIA: Status: ACTIVE | Noted: 2022-01-01

## 2022-04-25 PROBLEM — F41.9 ANXIETY: Status: ACTIVE | Noted: 2022-01-01

## 2022-04-26 PROBLEM — E87.6 HYPOKALEMIA: Status: RESOLVED | Noted: 2022-01-01 | Resolved: 2022-01-01

## 2022-05-19 NOTE — TELEPHONE ENCOUNTER
Unsuccessful contact with patient in regards to missing tobacco cessation follow up. Left a message with contact information to either complete the session or reschedule.

## 2022-10-12 ENCOUNTER — TELEPHONE (OUTPATIENT)
Dept: SMOKING CESSATION | Facility: CLINIC | Age: 57
End: 2022-10-12
Payer: MEDICARE

## 2023-05-17 ENCOUNTER — TELEPHONE (OUTPATIENT)
Dept: SMOKING CESSATION | Facility: CLINIC | Age: 58
End: 2023-05-17
Payer: MEDICARE

## 2023-05-17 NOTE — TELEPHONE ENCOUNTER
Opened chart to resolve smoking cessation episode for 12 month follow up. This was done by ALEXANDRA Carmen on 10/12/2022. PT's name still showed up on 12 month follow up report. Closed chart.